# Patient Record
Sex: MALE | URBAN - METROPOLITAN AREA
[De-identification: names, ages, dates, MRNs, and addresses within clinical notes are randomized per-mention and may not be internally consistent; named-entity substitution may affect disease eponyms.]

---

## 2023-06-22 ENCOUNTER — HOSPITAL ENCOUNTER (EMERGENCY)
Facility: CLINIC | Age: 48
Discharge: HOME | End: 2023-06-23
Attending: EMERGENCY MEDICINE | Admitting: EMERGENCY MEDICINE

## 2023-06-22 DIAGNOSIS — F19.920 DRUG INTOXICATION WITHOUT COMPLICATION (H): ICD-10-CM

## 2023-06-22 PROCEDURE — 99283 EMERGENCY DEPT VISIT LOW MDM: CPT

## 2023-06-23 VITALS
RESPIRATION RATE: 18 BRPM | HEART RATE: 88 BPM | OXYGEN SATURATION: 96 % | SYSTOLIC BLOOD PRESSURE: 125 MMHG | DIASTOLIC BLOOD PRESSURE: 92 MMHG | TEMPERATURE: 98 F

## 2023-06-23 PROCEDURE — 250N000011 HC RX IP 250 OP 636: Performed by: EMERGENCY MEDICINE

## 2023-06-23 RX ORDER — ONDANSETRON 4 MG/1
4 TABLET, ORALLY DISINTEGRATING ORAL ONCE
Status: COMPLETED | OUTPATIENT
Start: 2023-06-23 | End: 2023-06-23

## 2023-06-23 RX ADMIN — ONDANSETRON 4 MG: 4 TABLET, ORALLY DISINTEGRATING ORAL at 01:35

## 2023-06-23 ASSESSMENT — ACTIVITIES OF DAILY LIVING (ADL): ADLS_ACUITY_SCORE: 35

## 2023-06-23 NOTE — ED PROVIDER NOTES
History     Chief Complaint:  AMS    The history is provided by the patient.      Isaac Marroquin is a 47 year old male who presents via EMS from home for a drug assessment. The patient reports that today around 2000 he took five x 5 mg THC gummies. He states that he has never used THC edibles before and expresses concern that they may have contained fentanyl.  He states he feels very strange, has never felt this way before.  He specifically denies any intent to harm self or others.  This was recreational use of THC.  He denies experiencing suicidal ideation, homicidal ideation, or hallucinations. He did not consume any alcohol or use any other drugs. He states that he is otherwise healthy. He lives with his wife and 2 children and works in the [employer] business.     Independent Historian:   Wife, who later arrived in the emergency department, and confirmed the patient's history as above, states that he has never done this before, and she does not think that there is anything else going on.    Medications:    The patient is not currently taking any prescribed medications.    Past Medical History:   The patient does not have any past pertinent medical history.    Physical Exam     Patient Vitals for the past 24 hrs:   BP Temp Temp src Pulse Resp SpO2   06/23/23 0040 (!) 125/92 -- -- 88 -- 96 %   06/22/23 2346 (!) 131/96 98  F (36.7  C) Temporal 100 18 100 %      Physical Exam  General: Male recumbent in room 18, wife later at bedside  HENT: mucous membranes moist  Eyes: PERRL without nystagmus  CV: extremities well perfused, regular rhythm  Resp: normal effort, no stridor, no cough observed  GI: abdomen soft and nontender, no guarding  MSK: no bony tenderness   Skin: appropriately warm and dry  Neuro: alert, initially slightly slow but easily comprehensible speech, oriented, normal tone in extremities, ambulation not initially tested  Psych: somewhat anxious but remains in gurney, cooperative, denies feeling suicidal, no  evidence of hallucinations    Emergency Department Course     Emergency Department Course & Assessments:    Interventions:  Medications   ondansetron (ZOFRAN ODT) ODT tab 4 mg (4 mg Oral $Given 6/23/23 0135)        Assessments:  2353 I obtained history and examined the patient.  0134 I rechecked the patient.    Independent Interpretation (X-rays, CTs, rhythm strip):  None    Consultations/Discussion of Management or Tests:  None     Social Determinants of Health affecting care:   None    Disposition:  The patient was discharged to home.     Impression & Plan    Medical Decision Making:  His malaise and strange sensations can be explained by his intentional use of recreational THC.  He was monitored for period of time in the emergency department during which time his symptoms improved significantly.  His wife arrived and was comfortable taking him home.  I do not think that laboratory testing or hospitalization indicated at this time.  No evidence of decompensated psychiatric illness and formal psychiatric evaluation is therefore not indicated.  He was advised to refrain from using such substances.  He is welcome to return for reevaluation at any hour as needed.    Diagnosis:    ICD-10-CM    1. Drug intoxication without complication (H)  F19.920            Discharge Medications:  There are no discharge medications for this patient.       Scribe Disclosure:  Ayla PAREDES B.S. am serving as a scribe at 12:43 AM on 6/23/2023 to document services personally performed by Sivakumar Diez MD based on my observations and the provider's statements to me.        Sivakumar Diez MD  06/23/23 5694

## 2023-06-23 NOTE — ED TRIAGE NOTES
Patient was given a a packet of 5 mg THC gummies of which he took 5. Patient has apparently never used THC edibles before and is overwhelmed.      Triage Assessment     Row Name 06/22/23 9532       Triage Assessment (Adult)    Airway WDL WDL       Respiratory WDL    Respiratory WDL WDL       Skin Circulation/Temperature WDL    Skin Circulation/Temperature WDL WDL       Cardiac WDL    Cardiac WDL WDL       Peripheral/Neurovascular WDL    Peripheral Neurovascular WDL WDL       Cognitive/Neuro/Behavioral WDL    Cognitive/Neuro/Behavioral WDL WDL

## 2023-06-23 NOTE — DISCHARGE INSTRUCTIONS
Fortunately there are no signs of serious medical problem related to your THC use today.  I recommend that you avoid such drugs in the future.  No specific antidote is needed.

## 2023-06-23 NOTE — ED NOTES
Bed: ED18  Expected date: 6/22/23  Expected time: 11:40 PM  Means of arrival: Ambulance  Comments:  Abby M2 47M ingested 25 CBD gummies

## 2025-03-19 ENCOUNTER — MEDICAL CORRESPONDENCE (OUTPATIENT)
Dept: HEALTH INFORMATION MANAGEMENT | Facility: CLINIC | Age: 50
End: 2025-03-19

## 2025-03-20 DIAGNOSIS — Z02.5 ENCOUNTER FOR EXAMINATION FOR PARTICIPATION IN SPORT: Primary | ICD-10-CM
